# Patient Record
Sex: MALE | Race: WHITE
[De-identification: names, ages, dates, MRNs, and addresses within clinical notes are randomized per-mention and may not be internally consistent; named-entity substitution may affect disease eponyms.]

---

## 2018-05-29 ENCOUNTER — HOSPITAL ENCOUNTER (EMERGENCY)
Dept: HOSPITAL 25 - ED | Age: 48
Discharge: HOME | End: 2018-05-29
Payer: COMMERCIAL

## 2018-05-29 DIAGNOSIS — L40.9: ICD-10-CM

## 2018-05-29 DIAGNOSIS — E78.00: ICD-10-CM

## 2018-05-29 DIAGNOSIS — Z86.14: ICD-10-CM
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
## 2018-05-29 DIAGNOSIS — S61.213A: Primary | ICD-10-CM
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
## 2018-05-29 PROCEDURE — 12002 RPR S/N/AX/GEN/TRNK2.6-7.5CM: CPT

## 2018-05-29 PROCEDURE — 99282 EMERGENCY DEPT VISIT SF MDM: CPT

## 2018-05-29 NOTE — ED
Skin Complaint





- HPI Summary


HPI Summary: 





Rt hand dominant pt here w/ accidental lac to Lt dorsal middle finger w/ clean, 

sharp knife while cooking. Cleaned after injury and applied pressure and 

bandaid. Denies numbness, tingling, weakness. Imms are UTD. 





- History of Current Complaint


Chief Complaint: EDLacSutureRecheck


Time Seen by Provider: 05/29/18 23:09


Stated Complaint: LT MIDDLE FINGER LAC


Hx Obtained From: Patient


Pain Intensity: 4





- Allergy/Home Medications


Allergies/Adverse Reactions: 


 Allergies











Allergy/AdvReac Type Severity Reaction Status Date / Time


 


No Known Allergies Allergy   Verified 06/18/16 07:15














PMH/Surg Hx/FS Hx/Imm Hx


Previously Healthy: Yes


Endocrine/Hematology History: Reports: Other Endocrine/Hematological Disorders 

- Hx Psoriasis


   Denies: Hx Anticoagulant Therapy, Hx Blood Disorders, Hx Diabetes


Cardiovascular History: Reports: Hx Hypercholesterolemia


Sensory History: Reports: Hx Cataracts


   Denies: Hx Contacts or Glasses - did not bring glasses


Opthamlomology History: Reports: Hx Cataracts


   Denies: Hx Contacts or Glasses - did not bring glasses





- Surgical History


Surgery Procedure, Year, and Place: cataracts; teeth extracted





- Immunization History


Immunizations Up to Date: Yes


Infectious Disease History: No


Infectious Disease History: Reports: Hx of Known/Suspected MRSA - Left groin 

2016


   Denies: Hx Clostridium Difficile, History Other Infectious Disease, Traveled 

Outside the [place] in Last 30 Days





- Family History


Known Family History: Positive: Hypertension, Diabetes





- Social History


Occupation: Employed Full-time - [employer] 


Lives: With Family


Alcohol Use: None


Hx Substance Use: No


Substance Use Type: Reports: None


Hx Tobacco Use: No


Smoking Status (MU): Never Smoked Tobacco


Have You Smoked in the Last Year: No





Review of Systems


Positive: no symptoms reported


Musculoskeletal: Negative


Skin: Other - lac


Neurological: Negative


Psychological: Normal


All Other Systems Reviewed And Are Negative: Yes





Physical Exam


Triage Information Reviewed: Yes


Vital Signs On Initial Exam: 


 Initial Vitals











Temp Pulse Resp BP Pulse Ox


 


 97.5 F   64   20   129/74   98 


 


 05/29/18 21:49  05/29/18 21:49  05/29/18 21:49  05/29/18 21:49  05/29/18 21:49











Vital Signs Reviewed: Yes


Appearance: Positive: Well-Appearing, No Pain Distress, Well-Nourished


Skin: Positive: Warm, Skin Color Reflects Adequate Perfusion - linear lac over 

Lt dorsal middle finger- well approximated - no active bleeding


Head/Face: Positive: Normal Head/Face Inspection


Eyes: Positive: EOMI


ENT: Positive: Hearing grossly normal


Cardiovascular: Positive: Pulses are Symmetrical in both Upper and Lower 

Extremities


Musculoskeletal: Positive: Normal, Strength/ROM Intact - no tendon involovement


Neurological: Positive: Normal, Sensory/Motor Intact, Alert, Oriented to Person 

Place, Time


Psychiatric: Positive: Normal





Procedures





- Laceration/Wound Repair


  ** 1


Location: upper extremity - Lt middle finger


Description: Linear - 3cm


Length, Depth and Shape: 3cm x 2mm


Betadine Prep?: No


Irrigated w/ Saline (ccs): 500 - hibaclens w/ water solution soak


Laceration/Wound Explored: clean


Closure: Skin Adhesive, SteriStrips


Layer Closure?: No


Sterile Dressing Applied?: Yes - splint - hemodynamically stable - pt tolerated 

well





Diagnostics





- Vital Signs


 Vital Signs











  Temp Pulse Resp BP Pulse Ox


 


 05/29/18 21:49  97.5 F  64  20  129/74  98














- Laboratory


Lab Statement: Any lab studies that have been ordered have been reviewed, and 

results considered in the medical decision making process.





Course/Dx





- Diagnoses


Provider Diagnoses: 


 Laceration of left middle finger








Discharge





- Sign-Out/Discharge


Documenting (check all that apply): Discharge/Admit/Transfer





- Discharge Plan


Condition: Stable


Disposition: HOME


Patient Education Materials:  Finger Laceration (ED), Steristrips (ED), Skin 

Adhesive Care (ED)


Forms:  *Work Release


Referrals: 


Martha Gonzalez MD [Primary Care Provider] - 


Additional Instructions: 


Keep steristrip dressing clean and dry and in place for 5-7 days. Strips will 

fall off on their own. Do not remove prematurely. Keep splint in place for 7 

days. 





* If you develop redness, swelling, streaking, purulent drainage, fevers or 

chills, seek medical attention sooner or return to the emergency department.





- Billing Disposition and Condition


Condition: STABLE


Disposition: HOME

## 2018-05-30 VITALS — DIASTOLIC BLOOD PRESSURE: 74 MMHG | SYSTOLIC BLOOD PRESSURE: 109 MMHG
